# Patient Record
Sex: FEMALE | URBAN - METROPOLITAN AREA
[De-identification: names, ages, dates, MRNs, and addresses within clinical notes are randomized per-mention and may not be internally consistent; named-entity substitution may affect disease eponyms.]

---

## 2017-11-21 PROBLEM — O34.219 DELIVERY WITH HISTORY OF C-SECTION: Status: ACTIVE | Noted: 2017-11-21

## 2017-11-27 PROBLEM — D57.3 SICKLE CELL TRAIT: Status: ACTIVE | Noted: 2017-11-27

## 2019-10-12 ENCOUNTER — HOSPITAL ENCOUNTER (EMERGENCY)
Facility: HOSPITAL | Age: 34
Discharge: HOME | End: 2019-10-12
Attending: EMERGENCY MEDICINE

## 2019-10-12 VITALS
HEART RATE: 66 BPM | HEIGHT: 62 IN | BODY MASS INDEX: 29.44 KG/M2 | TEMPERATURE: 98 F | OXYGEN SATURATION: 100 % | DIASTOLIC BLOOD PRESSURE: 65 MMHG | RESPIRATION RATE: 18 BRPM | WEIGHT: 160 LBS | SYSTOLIC BLOOD PRESSURE: 105 MMHG

## 2019-10-12 DIAGNOSIS — S81.811A LACERATION OF RIGHT LOWER LEG: Primary | ICD-10-CM

## 2019-10-12 PROCEDURE — 63600175 PHARM REV CODE 636 W HCPCS: Performed by: EMERGENCY MEDICINE

## 2019-10-12 PROCEDURE — 90471 IMMUNIZATION ADMIN: CPT | Performed by: EMERGENCY MEDICINE

## 2019-10-12 PROCEDURE — 99283 EMERGENCY DEPT VISIT LOW MDM: CPT | Mod: 25

## 2019-10-12 PROCEDURE — 25000003 PHARM REV CODE 250: Performed by: EMERGENCY MEDICINE

## 2019-10-12 PROCEDURE — 12004 RPR S/N/AX/GEN/TRK7.6-12.5CM: CPT

## 2019-10-12 PROCEDURE — 90715 TDAP VACCINE 7 YRS/> IM: CPT | Performed by: EMERGENCY MEDICINE

## 2019-10-12 RX ORDER — OXYCODONE AND ACETAMINOPHEN 5; 325 MG/1; MG/1
1 TABLET ORAL
Status: COMPLETED | OUTPATIENT
Start: 2019-10-12 | End: 2019-10-12

## 2019-10-12 RX ORDER — LIDOCAINE HYDROCHLORIDE AND EPINEPHRINE 10; 10 MG/ML; UG/ML
10 INJECTION, SOLUTION INFILTRATION; PERINEURAL ONCE
Status: COMPLETED | OUTPATIENT
Start: 2019-10-12 | End: 2019-10-12

## 2019-10-12 RX ORDER — MUPIROCIN 20 MG/G
1 OINTMENT TOPICAL
Status: COMPLETED | OUTPATIENT
Start: 2019-10-12 | End: 2019-10-12

## 2019-10-12 RX ADMIN — MUPIROCIN 22 G: 20 OINTMENT TOPICAL at 08:10

## 2019-10-12 RX ADMIN — OXYCODONE HYDROCHLORIDE AND ACETAMINOPHEN 1 TABLET: 5; 325 TABLET ORAL at 06:10

## 2019-10-12 RX ADMIN — LIDOCAINE HYDROCHLORIDE AND EPINEPHRINE 10 ML: 10; 10 INJECTION, SOLUTION INFILTRATION; PERINEURAL at 07:10

## 2019-10-12 RX ADMIN — OXYCODONE HYDROCHLORIDE AND ACETAMINOPHEN 1 TABLET: 5; 325 TABLET ORAL at 08:10

## 2019-10-12 RX ADMIN — CLOSTRIDIUM TETANI TOXOID ANTIGEN (FORMALDEHYDE INACTIVATED), CORYNEBACTERIUM DIPHTHERIAE TOXOID ANTIGEN (FORMALDEHYDE INACTIVATED), BORDETELLA PERTUSSIS TOXOID ANTIGEN (GLUTARALDEHYDE INACTIVATED), BORDETELLA PERTUSSIS FILAMENTOUS HEMAGGLUTININ ANTIGEN (FORMALDEHYDE INACTIVATED), BORDETELLA PERTUSSIS PERTACTIN ANTIGEN, AND BORDETELLA PERTUSSIS FIMBRIAE 2/3 ANTIGEN 0.5 ML: 5; 2; 2.5; 5; 3; 5 INJECTION, SUSPENSION INTRAMUSCULAR at 07:10

## 2019-10-12 NOTE — ED PROVIDER NOTES
Encounter Date: 10/12/2019    SCRIBE #1 NOTE: I, Maurilio Farmer, am scribing for, and in the presence of, Donald Landon MD. Other sections scribed: HPI, ROS, PE.       History     Chief Complaint   Patient presents with    Extremity Laceration     Pt reports falling and having 3 inch lac to R leg. c/o hitting head, denies LOC. bleeding controlled.      This is a 34 y.o. female who presents to the ED with c/o a laceration to the right leg sustained a few minutes pta after the pt fell off of a ladder. She reports that she has a mild HA but denies hitting her head in the fall. Pain rated 10/10. Pt denies any LOC, numbness, weakness, or any other worsening or alleviating factors. She is not up to date with her Tetanus vaccine.    The history is provided by the patient and medical records. The history is limited by a language barrier. No  was used.     Review of patient's allergies indicates:  No Known Allergies  No past medical history on file.  Past Surgical History:   Procedure Laterality Date     SECTION       Family History   Problem Relation Age of Onset    Cancer Neg Hx      Social History     Tobacco Use    Smoking status: Never Smoker    Smokeless tobacco: Never Used   Substance Use Topics    Alcohol use: No    Drug use: No     Review of Systems   Constitutional: Negative for chills, diaphoresis and fever.   HENT: Negative for congestion and sore throat.    Eyes: Negative for visual disturbance.   Respiratory: Negative for cough and shortness of breath.    Cardiovascular: Negative for chest pain.   Gastrointestinal: Negative for abdominal pain, blood in stool, diarrhea, nausea and vomiting.        No melena.   Genitourinary: Negative for dysuria, flank pain and hematuria.   Skin: Positive for wound. Negative for rash.   Neurological: Negative for dizziness, speech difficulty, weakness, numbness and headaches.   Psychiatric/Behavioral: Negative for confusion.   All other systems  "reviewed and are negative.      Physical Exam     Initial Vitals [10/12/19 1838]   BP Pulse Resp Temp SpO2   (!) 148/86 79 18 97.8 °F (36.6 °C) 98 %      MAP       --         Physical Exam    Musculoskeletal:        Right knee: She exhibits laceration (3 in, non-ecchymotic, non-purulent).         ED Course   Lac Repair  Date/Time: 10/12/2019 7:14 PM  Performed by: Maurilio Giang  Authorized by: Donald Landon MD   Consent Done: Yes  Consent: Verbal consent obtained. Written consent obtained.  Risks and benefits: risks, benefits and alternatives were discussed  Patient understanding: patient states understanding of the procedure being performed  Patient consent: the patient's understanding of the procedure matches consent given  Procedure consent: procedure consent matches procedure scheduled  Relevant documents: relevant documents present and verified  Test results: test results available and properly labeled  Site marked: the operative site was marked  Imaging studies: imaging studies available  Patient identity confirmed: , MRN, provided demographic data, name and verbally with patient  Time out: Immediately prior to procedure a "time out" was called to verify the correct patient, procedure, equipment, support staff and site/side marked as required.  Body area: lower extremity  Location details: right knee  Laceration length: 10 cm  Foreign bodies: no foreign bodies  Tendon involvement: none  Nerve involvement: superficial  Vascular damage: no  Anesthesia: local infiltration    Anesthesia:  Local anesthesia used: yes  Local Anesthetic: lidocaine 1% with epinephrine  Patient sedated: no  Preparation: Patient was prepped and draped in the usual sterile fashion.  Technique: simple  Patient tolerance: Patient tolerated the procedure well with no immediate complications        Labs Reviewed - No data to display       Imaging Results          X-Ray Tibia Fibula 2 View Right (Final result)  Result time 10/12/19 " 19:08:12    Final result by Maurilio Yañez MD (10/12/19 19:08:12)                 Impression:      Mid right lower leg suspected soft tissue laceration without radiodense retained foreign body or acute osseous process seen.      Electronically signed by: Maurilio Yañez MD  Date:    10/12/2019  Time:    19:08             Narrative:    EXAMINATION:  XR TIBIA FIBULA 2 VIEW RIGHT    CLINICAL HISTORY:  Laceration without foreign body, right lower leg, initial encounter    TECHNIQUE:  AP and lateral views of the right tibia and fibula were performed.    COMPARISON:  None.    FINDINGS:  Bones are well mineralized. Overall alignment is within normal limits. No displaced fracture, dislocation or destructive osseous process. Joint spaces appear relatively maintained.    Skin irregularity along the ventral aspect of the midportion of the right lower leg likely representing laceration correlating with clinical history.  No subcutaneous emphysema or radiodense retained foreign body.                                 Medical Decision Making:   History:   Old Medical Records: I decided to obtain old medical records.            Scribe Attestation:   Scribe #1: I performed the above scribed service and the documentation accurately describes the services I performed. I attest to the accuracy of the note.      Pt arrived alert, afebrile, non-toxic in appearance, in no acute respiratory distress with VSS.  XR negative for fracture.  No foreign bodies noted.  Lac repaired w/o complication.  Tdap given.  Pt discharged and counseled on the need to return to the nearest emergency room if they experience any other concerning symptoms.  Pt counseled to F/U outpatient with a PCP over the next two to three days.    Donald Landon MD                 Clinical Impression:       ICD-10-CM ICD-9-CM   1. Laceration of right lower leg S81.811A 891.0            . I, Donald Landon, personally performed the services described in this  documentation. All medical record entries made by the scribe were at my direction and in my presence.  I have reviewed the chart and agree that the record reflects my personal performance and is accurate and complete.                     Donald Landon MD  10/12/19 [id number]

## 2019-10-18 ENCOUNTER — HOSPITAL ENCOUNTER (EMERGENCY)
Facility: HOSPITAL | Age: 34
Discharge: HOME | End: 2019-10-18
Attending: EMERGENCY MEDICINE

## 2019-10-18 VITALS
BODY MASS INDEX: 32.59 KG/M2 | SYSTOLIC BLOOD PRESSURE: 105 MMHG | RESPIRATION RATE: 18 BRPM | DIASTOLIC BLOOD PRESSURE: 66 MMHG | OXYGEN SATURATION: 100 % | WEIGHT: 166 LBS | TEMPERATURE: 99 F | HEIGHT: 60 IN | HEART RATE: 68 BPM

## 2019-10-18 DIAGNOSIS — Z48.02 VISIT FOR SUTURE REMOVAL: Primary | ICD-10-CM

## 2019-10-18 PROCEDURE — 99281 EMR DPT VST MAYX REQ PHY/QHP: CPT

## 2019-10-18 RX ORDER — ACETAMINOPHEN 325 MG/1
325 TABLET ORAL EVERY 6 HOURS PRN
COMMUNITY

## 2019-10-18 NOTE — ED TRIAGE NOTES
The patient presents to the ED via personal with family. The patient reports that she had sutures placed to a laceration on right lower leg on 10/12/19 and she is her to have them removed. Denies fevers, chills, drainage.

## 2019-10-18 NOTE — ED PROVIDER NOTES
Encounter Date: 10/18/2019    SCRIBE #1 NOTE: I, Iliana Johnston, am scribing for, and in the presence of,  Jose Alfredo Garcia DNP. I have scribed the following portions of the note - Other sections scribed: HPI, ROS.       History     Chief Complaint   Patient presents with    Wound Check     remove sutures from right leg     Time seen by the provider: 1330    CC: Wound Check    HPI: This is a 34 y.o. female with no PMHx who presents to the ED for a wound check to the right leg. The patient reports a laceration to the right leg that happened on 10/12/2019. She states that she was told to return to the ED to remove the sutures in 7 days. She notes experiencing slight pain and rates it a 6/10. She denies any drainage from the site. No other associated symptoms. No alleviating factors.    The history is provided by the patient. A  was used.     Review of patient's allergies indicates:  No Known Allergies  History reviewed. No pertinent past medical history.  Past Surgical History:   Procedure Laterality Date     SECTION       Family History   Problem Relation Age of Onset    Cancer Neg Hx      Social History     Tobacco Use    Smoking status: Never Smoker    Smokeless tobacco: Never Used   Substance Use Topics    Alcohol use: No    Drug use: No     Review of Systems   Constitutional: Negative for chills and fever.   HENT: Negative for sore throat.    Respiratory: Negative for shortness of breath.    Cardiovascular: Negative for chest pain.   Gastrointestinal: Negative for nausea.   Genitourinary: Negative for dysuria.   Musculoskeletal: Negative for back pain.   Skin: Negative for rash.        (+) Wound check   Neurological: Negative for weakness.   Hematological: Does not bruise/bleed easily.       Physical Exam     Initial Vitals [10/18/19 1241]   BP Pulse Resp Temp SpO2   109/68 71 17 97.9 °F (36.6 °C) 100 %      MAP       --         Physical Exam    Nursing note and vitals  reviewed.  Constitutional: She appears well-developed and well-nourished.   HENT:   Head: Normocephalic and atraumatic.   Right Ear: External ear normal.   Left Ear: External ear normal.   Nose: Nose normal.   Eyes: Conjunctivae and EOM are normal. Pupils are equal, round, and reactive to light. Right eye exhibits no discharge. Left eye exhibits no discharge.   Neck: Normal range of motion.   Abdominal: She exhibits no distension.   Musculoskeletal: Normal range of motion.        Legs:  Neurological: She is alert and oriented to person, place, and time.   Skin: Skin is dry. Capillary refill takes less than 2 seconds.         ED Course   Suture Removal  Date/Time: 10/19/2019 12:25 AM  Performed by: Jose Alfredo Garcia DNP  Authorized by: Anthony Yanez MD   Body area: lower extremity  Location details: right lower leg  Wound Appearance: clean, nontender, nonpurulent and well healed  Sutures Removed: 10  Post-removal: Steri-Strips applied  Facility: sutures placed in this facility        Labs Reviewed - No data to display       Imaging Results    None          Medical Decision Making:   ED Management:  This is a 34 y.o. female that presents to the Emergency Department for a wound check. There is a sutured wound to the right anterior lower leg. Physical exam shows a non-toxic, afebrile, well appearing female. The sutures were removed. Wound care instructions given. ED return precautions were discussed and understanding was verbalized. All questions or concerns have been addressed.            Scribe Attestation:   Scribe #1: I performed the above scribed service and the documentation accurately describes the services I performed. I attest to the accuracy of the note.    Attending Attestation:           Physician Attestation for Scribe:  Physician Attestation Statement for Scribe #1: I, Jose Alfredo Garcia DNP, reviewed documentation, as scribed by Iliana Johnston in my presence, and it is both accurate and complete.                     Clinical Impression:       ICD-10-CM ICD-9-CM   1. Visit for suture removal Z48.02 V58.32            Scribe attestation: IPRISCILA, RAS [facility] FNP-C , personally performed the services described in this documentation. All medical record entries made by the scribe were at my direction and in my presence. I have reviewed the chart and agree that the record reflects my personal performance and is accurate and complete.                    Jose Alfredo Garcia DNP  10/19/19 0026